# Patient Record
Sex: MALE | Race: NATIVE HAWAIIAN OR OTHER PACIFIC ISLANDER
[De-identification: names, ages, dates, MRNs, and addresses within clinical notes are randomized per-mention and may not be internally consistent; named-entity substitution may affect disease eponyms.]

---

## 2018-02-12 ENCOUNTER — HOSPITAL ENCOUNTER (EMERGENCY)
Dept: HOSPITAL 31 - C.ER | Age: 43
Discharge: HOME | End: 2018-02-12
Payer: COMMERCIAL

## 2018-02-12 VITALS — RESPIRATION RATE: 16 BRPM | OXYGEN SATURATION: 98 %

## 2018-02-12 VITALS — DIASTOLIC BLOOD PRESSURE: 96 MMHG | HEART RATE: 65 BPM | SYSTOLIC BLOOD PRESSURE: 138 MMHG

## 2018-02-12 VITALS — BODY MASS INDEX: 31 KG/M2

## 2018-02-12 VITALS — TEMPERATURE: 98.5 F

## 2018-02-12 DIAGNOSIS — I10: Primary | ICD-10-CM

## 2018-02-12 DIAGNOSIS — R42: ICD-10-CM

## 2018-02-12 NOTE — C.PDOC
History Of Present Illness


43 yr old male with PMHx of HTN, presents to the ER for feeling dizzy and light 

headedness since 0730 since morning. Patient states he recently ran out of his 

HTN medication and has not taken it for the past 2 days. States he checked his 

blood pressure while at work today and was found to be 190/110 and repeat blood 

pressure was 190/100. Currently, patient states he feels better and is 

asymptomatic. Denies fever, chills, vision changes, chest pain, SOB, nausea, 

vomiting, weakness, numbness or headache.


Time Seen by Provider: 18 08:22


Chief Complaint (Nursing): High Blood Pressure


History Per: Patient


History/Exam Limitations: no limitations


Onset/Duration Of Symptoms: Sudden Onset (0730)


Current Symptoms Are (Timing): Better





Past Medical History


Reviewed: Historical Data, Nursing Documentation, Vital Signs


Vital Signs: 


 Last Vital Signs











Temp  98.5 F   18 08:31


 


Pulse  65   18 09:59


 


Resp  16   18 09:59


 


BP  138/96 H  18 09:59


 


Pulse Ox  98   18 10:02














- Medical History


PMH: Asthma, Back Problems, HTN


Surgical History: Back Surgery


Family History: States: No Known Family Hx





- Social History


Hx Alcohol Use: Yes


Hx Substance Use: No





- Immunization History


Hx Tetanus Toxoid Vaccination: No


Hx Influenza Vaccination: Yes


Hx Pneumococcal Vaccination: No





Review Of Systems


Except As Marked, All Systems Reviewed And Found Negative.


Constitutional: Negative for: Fever, Chills


Eyes: Negative for: Vision Change


Cardiovascular: Positive for: Light Headedness.  Negative for: Chest Pain


Respiratory: Negative for: Shortness of Breath


Gastrointestinal: Negative for: Nausea, Vomiting


Neurological: Positive for: Dizziness.  Negative for: Weakness, Numbness, 

Headache





Physical Exam





- Physical Exam


Appears: Non-toxic, No Acute Distress


Skin: Warm, Dry, No Rash


Head: Atraumatic, Normacephalic


Eye(s): bilateral: Normal Inspection, PERRL, EOMI


Ear(s): Bilateral: Normal


Oral Mucosa: Moist


Lips: Normal Appearing


Throat: Normal, No Erythema, No Exudate


Neck: Normal, Normal ROM, Supple


Chest: Symmetrical, No Tenderness


Cardiovascular: Rhythm Regular, No Friction Rub, No Murmur


Respiratory: Normal Breath Sounds, No Rales, No Rhonchi, No Stridor, No Wheezing


Gastrointestinal/Abdominal: Normal Exam, Soft, No Tenderness, No Guarding, No 

Rebound


Back: Normal Inspection, No CVA Tenderness


Extremity: Normal ROM, No Tenderness, No Swelling


Neurological/Psych: Oriented x3, Normal Speech, Normal Cranial Nerves, Normal 

Motor


Gait: Steady





ED Course And Treatment


ECG: Interpreted By Me, Viewed By Me


ECG Rhythm: Sinus Rhythm


ECG Interpretation: Normal


Interpretation Of ECG: Normal ST/T waves. Normal axis.


Rate From EC (BPM)


O2 Sat by Pulse Oximetry: 98 (RA)


Pulse Ox Interpretation: Normal





Medical Decision Making


Medical Decision Making: 


PLAN:


* EKG


* Enalapril PO 





NOTE: 


* Patient will be treated with Enalapril 10mg and repeat blood pressure


Repeat BP has improved. 


On re-exam, the patient reports improvement of symptoms. Lungs are CTA, heart 

is RRR, abdomen is soft, non-tender and the patient is tolerating PO well. 

Ambulatory in the ED with steady gait. Follow up with the medical doctor/clinic 

within 1-2 days without fail. Return if worsened. 





Disposition





- Disposition


Referrals: 


Jim Hemphill MD [Staff Provider] - 


Disposition: HOME/ ROUTINE


Disposition Time: 09:56


Condition: GOOD


Additional Instructions: 


Follow up with the medical doctor/clinic within 1-2 days without fail. Return 

if worsened. 


Prescriptions: 


Enalapril Maleate [Vasotec] 10 mg PO DAILY #5 tab


Instructions:  Hypertension (ED)


Forms:  [facility] (English), Work Excuse





- Clinical Impression


Clinical Impression: 


 Hypertension, Dizziness








- PA / NP / Resident Statement


MD/DO has reviewed & agrees with the documentation as recorded.





- Scribe Statement


The provider has reviewed the documentation as recorded by the Scribe


Veronica Jerry





All medical record entries made by the Scribe were at my direction and 

personally dictated by me. I have reviewed the chart and agree that the record 

accurately reflects my personal performance of the history, physical exam, 

medical decision making, and the department course for this patient. I have 

also personally directed, reviewed, and agree with the discharge instructions 

and disposition.

## 2018-02-14 NOTE — CARD
--------------- APPROVED REPORT --------------





EKG Measurement

Heart [id number]

DE [id number]

[id number]

[id number]

[id number]



<Conclusion>

Normal sinus rhythm

Normal ECG